# Patient Record
Sex: FEMALE | Race: OTHER | HISPANIC OR LATINO | URBAN - METROPOLITAN AREA
[De-identification: names, ages, dates, MRNs, and addresses within clinical notes are randomized per-mention and may not be internally consistent; named-entity substitution may affect disease eponyms.]

---

## 2022-07-11 ENCOUNTER — OUTPATIENT (OUTPATIENT)
Dept: OUTPATIENT SERVICES | Facility: HOSPITAL | Age: 27
LOS: 1 days | End: 2022-07-11
Payer: MEDICAID

## 2022-07-11 VITALS
DIASTOLIC BLOOD PRESSURE: 64 MMHG | HEART RATE: 67 BPM | WEIGHT: 127.43 LBS | SYSTOLIC BLOOD PRESSURE: 110 MMHG | OXYGEN SATURATION: 100 % | RESPIRATION RATE: 16 BRPM | TEMPERATURE: 99 F

## 2022-07-11 DIAGNOSIS — Z01.818 ENCOUNTER FOR OTHER PREPROCEDURAL EXAMINATION: ICD-10-CM

## 2022-07-11 DIAGNOSIS — Z33.2 ENCOUNTER FOR ELECTIVE TERMINATION OF PREGNANCY: ICD-10-CM

## 2022-07-11 DIAGNOSIS — Z90.49 ACQUIRED ABSENCE OF OTHER SPECIFIED PARTS OF DIGESTIVE TRACT: Chronic | ICD-10-CM

## 2022-07-11 LAB
BLD GP AB SCN SERPL QL: SIGNIFICANT CHANGE UP
HCG SERPL-ACNC: HIGH MIU/ML
HCT VFR BLD CALC: 39 % — SIGNIFICANT CHANGE UP (ref 34.5–45)
HGB BLD-MCNC: 12.4 G/DL — SIGNIFICANT CHANGE UP (ref 11.5–15.5)
MCHC RBC-ENTMCNC: 26.1 PG — LOW (ref 27–34)
MCHC RBC-ENTMCNC: 31.8 GM/DL — LOW (ref 32–36)
MCV RBC AUTO: 81.9 FL — SIGNIFICANT CHANGE UP (ref 80–100)
NRBC # BLD: 0 /100 WBCS — SIGNIFICANT CHANGE UP (ref 0–0)
PLATELET # BLD AUTO: 303 K/UL — SIGNIFICANT CHANGE UP (ref 150–400)
RBC # BLD: 4.76 M/UL — SIGNIFICANT CHANGE UP (ref 3.8–5.2)
RBC # FLD: 14.1 % — SIGNIFICANT CHANGE UP (ref 10.3–14.5)
SARS-COV-2 RNA SPEC QL NAA+PROBE: SIGNIFICANT CHANGE UP
WBC # BLD: 8.4 K/UL — SIGNIFICANT CHANGE UP (ref 3.8–10.5)
WBC # FLD AUTO: 8.4 K/UL — SIGNIFICANT CHANGE UP (ref 3.8–10.5)

## 2022-07-11 PROCEDURE — U0005: CPT

## 2022-07-11 PROCEDURE — G0463: CPT

## 2022-07-11 PROCEDURE — U0003: CPT

## 2022-07-11 PROCEDURE — 86850 RBC ANTIBODY SCREEN: CPT

## 2022-07-11 PROCEDURE — 36415 COLL VENOUS BLD VENIPUNCTURE: CPT

## 2022-07-11 PROCEDURE — 85027 COMPLETE CBC AUTOMATED: CPT

## 2022-07-11 PROCEDURE — 86900 BLOOD TYPING SEROLOGIC ABO: CPT

## 2022-07-11 PROCEDURE — 86901 BLOOD TYPING SEROLOGIC RH(D): CPT

## 2022-07-11 PROCEDURE — 84702 CHORIONIC GONADOTROPIN TEST: CPT

## 2022-07-11 NOTE — H&P PST ADULT - NSANTHOSAYNRD_GEN_A_CORE
No. CIERA screening performed.  STOP BANG Legend: 0-2 = LOW Risk; 3-4 = INTERMEDIATE Risk; 5-8 = HIGH Risk

## 2022-07-11 NOTE — H&P PST ADULT - PROBLEM SELECTOR PLAN 2
No medical clearance needed as per surgeon. CBC, T&S, HCG  and COVID19 PCR ordered. Pre-op instructions and surgical scrubs given and pt verbalized understanding. No medical clearance needed as per surgeon. CBC, T&S, HCG  and COVID19 PCR ordered. Pre-op instructions given and pt verbalized understanding.

## 2022-07-11 NOTE — H&P PST ADULT - HISTORY OF PRESENT ILLNESS
27 y/o female with no PMH here for PST. LMP - 22. Pt desiring termination. Pt denies n/v, vaginal cramping and abdominal pain. ,  x 2. Pt electing for dilation and curettage on 2022.  27 y/o female with no PMH here for PST. Pt is 6 weeks gestation. LMP - 22. Pt desiring termination. Pt denies n/v, vaginal cramping and abdominal pain. ,  x 2. Pt electing for dilation and curettage on 2022.

## 2022-07-11 NOTE — H&P PST ADULT - GASTROINTESTINAL
negative normal/soft/nontender/nondistended/normal active bowel sounds normal/soft/nondistended/normal active bowel sounds/tender

## 2022-07-11 NOTE — H&P PST ADULT - NEUROLOGICAL
normal/cranial nerves II-XII intact/sensation intact details… normal/sensation intact/responds to verbal commands

## 2022-07-11 NOTE — H&P PST ADULT - ASSESSMENT
27 y/o female with encounter for elective termination of pregnancy  25 y/o female with encounter for elective termination of pregnancy.

## 2022-07-11 NOTE — H&P PST ADULT - ACTIVITY
minor surgery planned
Clothing
Pt can walk up 2 flights of stairs and walk 4 blocks without difficulty

## 2022-07-11 NOTE — H&P PST ADULT - FALL HARM RISK - UNIVERSAL INTERVENTIONS
Bed in lowest position, wheels locked, appropriate side rails in place/Call bell, personal items and telephone in reach/Instruct patient to call for assistance before getting out of bed or chair/Non-slip footwear when patient is out of bed/[place] to call system/Physically safe environment - no spills, clutter or unnecessary equipment/Purposeful Proactive Rounding/Room/bathroom lighting operational, light cord in reach

## 2022-07-13 RX ORDER — SODIUM CHLORIDE 9 MG/ML
1000 INJECTION, SOLUTION INTRAVENOUS
Refills: 0 | Status: DISCONTINUED | OUTPATIENT
Start: 2022-07-14 | End: 2022-07-28

## 2022-07-14 ENCOUNTER — OUTPATIENT (OUTPATIENT)
Dept: OUTPATIENT SERVICES | Facility: HOSPITAL | Age: 27
LOS: 1 days | End: 2022-07-14
Payer: MEDICAID

## 2022-07-14 VITALS
DIASTOLIC BLOOD PRESSURE: 55 MMHG | RESPIRATION RATE: 14 BRPM | SYSTOLIC BLOOD PRESSURE: 98 MMHG | TEMPERATURE: 98 F | WEIGHT: 127.43 LBS | OXYGEN SATURATION: 99 % | HEART RATE: 77 BPM

## 2022-07-14 VITALS
RESPIRATION RATE: 15 BRPM | DIASTOLIC BLOOD PRESSURE: 68 MMHG | TEMPERATURE: 98 F | OXYGEN SATURATION: 100 % | SYSTOLIC BLOOD PRESSURE: 106 MMHG | HEART RATE: 70 BPM

## 2022-07-14 DIAGNOSIS — Z90.49 ACQUIRED ABSENCE OF OTHER SPECIFIED PARTS OF DIGESTIVE TRACT: Chronic | ICD-10-CM

## 2022-07-14 DIAGNOSIS — Z33.2 ENCOUNTER FOR ELECTIVE TERMINATION OF PREGNANCY: ICD-10-CM

## 2022-07-14 PROCEDURE — 93005 ELECTROCARDIOGRAM TRACING: CPT

## 2022-07-14 PROCEDURE — 93010 ELECTROCARDIOGRAM REPORT: CPT

## 2022-07-14 PROCEDURE — 88305 TISSUE EXAM BY PATHOLOGIST: CPT

## 2022-07-14 PROCEDURE — 88305 TISSUE EXAM BY PATHOLOGIST: CPT | Mod: 26

## 2022-07-14 PROCEDURE — 59840 INDUCED ABORTION D&C: CPT

## 2022-07-14 RX ORDER — SODIUM CHLORIDE 9 MG/ML
1000 INJECTION, SOLUTION INTRAVENOUS
Refills: 0 | Status: DISCONTINUED | OUTPATIENT
Start: 2022-07-14 | End: 2022-07-14

## 2022-07-14 RX ORDER — OXYCODONE HYDROCHLORIDE 5 MG/1
5 TABLET ORAL ONCE
Refills: 0 | Status: DISCONTINUED | OUTPATIENT
Start: 2022-07-14 | End: 2022-07-14

## 2022-07-14 RX ORDER — HYDROMORPHONE HYDROCHLORIDE 2 MG/ML
0.5 INJECTION INTRAMUSCULAR; INTRAVENOUS; SUBCUTANEOUS
Refills: 0 | Status: DISCONTINUED | OUTPATIENT
Start: 2022-07-14 | End: 2022-07-14

## 2022-07-14 RX ORDER — ONDANSETRON 8 MG/1
4 TABLET, FILM COATED ORAL ONCE
Refills: 0 | Status: DISCONTINUED | OUTPATIENT
Start: 2022-07-14 | End: 2022-07-14

## 2022-07-14 RX ADMIN — SODIUM CHLORIDE 75 MILLILITER(S): 9 INJECTION, SOLUTION INTRAVENOUS at 10:28

## 2022-07-14 NOTE — ASU DISCHARGE PLAN (ADULT/PEDIATRIC) - CARE PROVIDER_API CALL
Rigo Stuart)  Obstetrics and Gynecology  [address] [place] [address]  Phone: [phone]  Fax: [phone]  Follow Up Time:

## 2022-07-14 NOTE — ASU DISCHARGE PLAN (ADULT/PEDIATRIC) - NS MD DC FALL RISK RISK
For information on Fall & Injury Prevention, visit: [email or web address] OR  [email or web address] OR  https://www.cdc.gov/steadi/patient.html

## 2022-07-15 LAB — SURGICAL PATHOLOGY STUDY: SIGNIFICANT CHANGE UP

## 2023-04-03 ENCOUNTER — OUTPATIENT (OUTPATIENT)
Dept: OUTPATIENT SERVICES | Facility: HOSPITAL | Age: 28
LOS: 1 days | End: 2023-04-03
Payer: MEDICAID

## 2023-04-03 VITALS
WEIGHT: 151.9 LBS | DIASTOLIC BLOOD PRESSURE: 71 MMHG | HEIGHT: 62 IN | OXYGEN SATURATION: 98 % | SYSTOLIC BLOOD PRESSURE: 112 MMHG | TEMPERATURE: 98 F

## 2023-04-03 DIAGNOSIS — Z01.818 ENCOUNTER FOR OTHER PREPROCEDURAL EXAMINATION: ICD-10-CM

## 2023-04-03 DIAGNOSIS — Z90.49 ACQUIRED ABSENCE OF OTHER SPECIFIED PARTS OF DIGESTIVE TRACT: Chronic | ICD-10-CM

## 2023-04-03 DIAGNOSIS — Z98.890 OTHER SPECIFIED POSTPROCEDURAL STATES: Chronic | ICD-10-CM

## 2023-04-03 DIAGNOSIS — R87.612 LOW GRADE SQUAMOUS INTRAEPITHELIAL LESION ON CYTOLOGIC SMEAR OF CERVIX (LGSIL): ICD-10-CM

## 2023-04-03 LAB
HCG SERPL-ACNC: <1 MIU/ML — SIGNIFICANT CHANGE UP
HCT VFR BLD CALC: 37.7 % — SIGNIFICANT CHANGE UP (ref 34.5–45)
HGB BLD-MCNC: 11.2 G/DL — LOW (ref 11.5–15.5)
MCHC RBC-ENTMCNC: 23.4 PG — LOW (ref 27–34)
MCHC RBC-ENTMCNC: 29.7 GM/DL — LOW (ref 32–36)
MCV RBC AUTO: 78.9 FL — LOW (ref 80–100)
NRBC # BLD: 0 /100 WBCS — SIGNIFICANT CHANGE UP (ref 0–0)
PLATELET # BLD AUTO: 293 K/UL — SIGNIFICANT CHANGE UP (ref 150–400)
RBC # BLD: 4.78 M/UL — SIGNIFICANT CHANGE UP (ref 3.8–5.2)
RBC # FLD: 15.9 % — HIGH (ref 10.3–14.5)
WBC # BLD: 8.73 K/UL — SIGNIFICANT CHANGE UP (ref 3.8–10.5)
WBC # FLD AUTO: 8.73 K/UL — SIGNIFICANT CHANGE UP (ref 3.8–10.5)

## 2023-04-03 PROCEDURE — 88321 CONSLTJ&REPRT SLD PREP ELSWR: CPT

## 2023-04-03 PROCEDURE — 84702 CHORIONIC GONADOTROPIN TEST: CPT

## 2023-04-03 PROCEDURE — 85027 COMPLETE CBC AUTOMATED: CPT

## 2023-04-03 PROCEDURE — G0463: CPT

## 2023-04-03 PROCEDURE — 36415 COLL VENOUS BLD VENIPUNCTURE: CPT

## 2023-04-03 NOTE — H&P PST ADULT - PROBLEM SELECTOR PLAN 2
Labs - CBC and HCG  No MC needed  Pre op instructions reviewed and given. No meds to take am of surgery. Instructed to hold and/or avoid other NSAIDs and OTC supplements. Tylenol is ok. Verbalized understanding

## 2023-04-03 NOTE — H&P PST ADULT - NSICDXPASTMEDICALHX_GEN_ALL_CORE_FT
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

## 2023-04-03 NOTE — H&P PST ADULT - NSANTHOSAYNRD_GEN_A_CORE
Denies CIERA/No. CIERA screening performed.  STOP BANG Legend: 0-2 = LOW Risk; 3-4 = INTERMEDIATE Risk; 5-8 = HIGH Risk

## 2023-04-03 NOTE — H&P PST ADULT - RESPIRATORY
no wheezes/no respiratory distress/no use of accessory muscles/good air movement/respirations non-labored

## 2023-04-03 NOTE — H&P PST ADULT - NSICDXPASTSURGICALHX_GEN_ALL_CORE_FT
PAST SURGICAL HISTORY:  One previous induced termination of pregnancy     S/P cholecystectomy (4/8/2022)

## 2023-04-03 NOTE — H&P PST ADULT - HISTORY OF PRESENT ILLNESS
26 yo [place]ander speaking healthy female with no PMH, presents to PST scheduled for a cold knife cone biopsy cervix on 4/6 with Dr. Stuart. Reports that her last Pap smear was abnormal. Denies abnormal bleeding and vaginal/pelvic cramping and pain

## 2023-04-03 NOTE — H&P PST ADULT - SOURCE OF INFORMATION, PROFILE
Marquise VINITA Shaikh discharged to home accompanied by no one  Patient provided with the following educational materials upon discharge:  cellulitis.   Valuables and belongings sent with patient.   discharge summary, discharge instructions, medications and follow up appointments reviewed with patient  Patient  verbalized understanding   patient/chart(s)

## 2023-04-05 LAB — SURGICAL PATHOLOGY STUDY: SIGNIFICANT CHANGE UP

## 2023-04-05 NOTE — ASU PATIENT PROFILE, ADULT - FALL HARM RISK - UNIVERSAL INTERVENTIONS
Bed in lowest position, wheels locked, appropriate side rails in place/Call bell, personal items and telephone in reach/Instruct patient to call for assistance before getting out of bed or chair/Non-slip footwear when patient is out of bed/[place] to call system/Physically safe environment - no spills, clutter or unnecessary equipment/Purposeful Proactive Rounding/Room/bathroom lighting operational, light cord in reach

## 2023-04-05 NOTE — ASU PATIENT PROFILE, ADULT - CENTRAL VENOUS CATHETER
7/11/2017    Yi Nix  [address] [facility] [address]    Dear Yi:    Critical access hospital wants to ensure your discharge home is safe and you or your loved ones have had all of your questions answered regarding your care after you leave the hospital.    Below is a list of resources and contact information should you have any questions regarding your hospital stay, follow-up instructions, or active medical symptoms.    Questions or Concerns Regarding… Contact   Medical Questions Related to Your Discharge  (7 days a week, 8am-5pm) Contact a Nurse Care Coordinator   [phone]   Medical Questions Not Related to Your Discharge  (24 hours a day / 7 days a week)  Contact the Nurse Health Line   [phone]    Medications or Discharge Instructions Refer to your discharge packet   or contact your [facility] Primary Care Provider   [phone]   Follow-up Appointment(s) Schedule your appointment via [employer]   or contact Scheduling [phone]   Billing Review your statement via [employer]  or contact Billing [phone]   Medical Records Review your records via [employer]   or contact Medical Records [phone]     You may receive a telephone call within two days of discharge. This call is to make certain you understand your discharge instructions and have the opportunity to have any questions answered. You can also easily access your medical information, test results and upcoming appointments via the [employer] free online health management tool. You can learn more and sign up at [email or web address]. For assistance setting up your [employer] account, please call [phone].    Once again, we want to ensure your discharge home is safe and that you have a clear understanding of any next steps in your care. If you have any questions or concerns, please do not hesitate to contact us, we are here for you. Thank you for choosing [facility] for your healthcare needs.    Sincerely,    Your [facility] Healthcare Team          no

## 2023-04-06 ENCOUNTER — OUTPATIENT (OUTPATIENT)
Dept: OUTPATIENT SERVICES | Facility: HOSPITAL | Age: 28
LOS: 1 days | End: 2023-04-06
Payer: MEDICAID

## 2023-04-06 VITALS
RESPIRATION RATE: 16 BRPM | WEIGHT: 149.91 LBS | HEIGHT: 64 IN | DIASTOLIC BLOOD PRESSURE: 72 MMHG | OXYGEN SATURATION: 100 % | TEMPERATURE: 98 F | HEART RATE: 88 BPM | SYSTOLIC BLOOD PRESSURE: 114 MMHG

## 2023-04-06 VITALS
RESPIRATION RATE: 12 BRPM | HEART RATE: 55 BPM | DIASTOLIC BLOOD PRESSURE: 54 MMHG | SYSTOLIC BLOOD PRESSURE: 89 MMHG | OXYGEN SATURATION: 98 %

## 2023-04-06 DIAGNOSIS — Z98.890 OTHER SPECIFIED POSTPROCEDURAL STATES: Chronic | ICD-10-CM

## 2023-04-06 DIAGNOSIS — Z01.818 ENCOUNTER FOR OTHER PREPROCEDURAL EXAMINATION: ICD-10-CM

## 2023-04-06 DIAGNOSIS — Z90.49 ACQUIRED ABSENCE OF OTHER SPECIFIED PARTS OF DIGESTIVE TRACT: Chronic | ICD-10-CM

## 2023-04-06 DIAGNOSIS — R87.612 LOW GRADE SQUAMOUS INTRAEPITHELIAL LESION ON CYTOLOGIC SMEAR OF CERVIX (LGSIL): ICD-10-CM

## 2023-04-06 PROCEDURE — 88305 TISSUE EXAM BY PATHOLOGIST: CPT

## 2023-04-06 PROCEDURE — 88307 TISSUE EXAM BY PATHOLOGIST: CPT | Mod: 26

## 2023-04-06 PROCEDURE — 88305 TISSUE EXAM BY PATHOLOGIST: CPT | Mod: 26

## 2023-04-06 PROCEDURE — 57520 CONIZATION OF CERVIX: CPT

## 2023-04-06 PROCEDURE — 88307 TISSUE EXAM BY PATHOLOGIST: CPT

## 2023-04-06 RX ORDER — HYDROMORPHONE HYDROCHLORIDE 2 MG/ML
0.5 INJECTION INTRAMUSCULAR; INTRAVENOUS; SUBCUTANEOUS
Refills: 0 | Status: DISCONTINUED | OUTPATIENT
Start: 2023-04-06 | End: 2023-04-06

## 2023-04-06 RX ORDER — SODIUM CHLORIDE 9 MG/ML
1000 INJECTION, SOLUTION INTRAVENOUS
Refills: 0 | Status: DISCONTINUED | OUTPATIENT
Start: 2023-04-06 | End: 2023-04-06

## 2023-04-06 RX ORDER — ACETAMINOPHEN 500 MG
1000 TABLET ORAL ONCE
Refills: 0 | Status: COMPLETED | OUTPATIENT
Start: 2023-04-06 | End: 2023-04-06

## 2023-04-06 RX ORDER — ONDANSETRON 8 MG/1
4 TABLET, FILM COATED ORAL ONCE
Refills: 0 | Status: DISCONTINUED | OUTPATIENT
Start: 2023-04-06 | End: 2023-04-06

## 2023-04-06 RX ADMIN — Medication 400 MILLIGRAM(S): at 09:05

## 2023-04-06 RX ADMIN — SODIUM CHLORIDE 75 MILLILITER(S): 9 INJECTION, SOLUTION INTRAVENOUS at 09:04

## 2023-04-06 NOTE — ASU DISCHARGE PLAN (ADULT/PEDIATRIC) - CARE PROVIDER_API CALL
Rigo Stuart)  Obstetrics and Gynecology  [address] [place] [address]  Phone: [phone]  Fax: [phone]  Follow Up Time:

## 2023-04-07 LAB — SURGICAL PATHOLOGY STUDY: SIGNIFICANT CHANGE UP

## 2023-09-21 NOTE — ASU PATIENT PROFILE, ADULT - HOW PATIENT ADDRESSED, PROFILE
Winlevi Counseling:  I discussed with the patient the risks of topical clascoterone including but not limited to erythema, scaling, itching, and stinging. Patient voiced their understanding. lorie

## 2023-12-22 NOTE — H&P PST ADULT - NEUROLOGICAL
Sinusitis (Antibiotic Treatment)  The sinuses are air-filled spaces within the bones of the face. They connect to the inside of the nose. Sinusitis is an inflammation of the tissue lining the sinus cavity. Sinus inflammation can occur during a cold. It can also be due to allergies to pollens and other particles in the air. Sinusitis can cause symptoms of sinus congestion and fullness. A sinus infection causes fever, headache and facial pain. There is often green or yellow drainage from the nose or into the back of the throat (post-nasal drip). You have been given antibiotics to treat this condition.  Home care:  Take the full course of antibiotics as instructed. Do not stop taking them, even if you feel better.  Drink plenty of water, hot tea, and other liquids. This may help thin mucus. It also may promote sinus drainage.  Heat may help soothe painful areas of the face. Use a towel soaked in hot water. Or,  the shower and direct the hot spray onto your face. Using a vaporizer along with a menthol rub at night may also help.   An expectorant containing guaifenesin may help thin the mucus and promote drainage from the sinuses.  Use acetaminophen or ibuprofen to control pain, unless another pain medicine was prescribed. (If you have chronic liver or kidney disease or ever had a stomach ulcer, talk with your doctor before using these medicines. Aspirin should never be used in anyone under 18 years of age who is ill with a fever. It may cause severe liver damage.)  Don't smoke. This can worsen symptoms.  Stop using antihistamines like Claritin/Zyrtec for now -- these can prolong your sinus symptoms by making the mucus more \"sticky/glue-like\".  See more advice on OTC/home remedies below.  Follow-up care  Follow up with your healthcare provider if you are not improving within the next week.  When to seek medical advice  Call your healthcare provider if any of these occur:  Facial pain or headache becoming more  severe  Stiff neck  Unusual drowsiness or confusion  Swelling of the forehead or eyelids  Vision problems, including blurred or double vision  Fever of 100.4ºF (38ºC) or higher, or as directed by your healthcare provider  Seizure  Breathing problems  Symptoms not resolving within 10 days        ----------------------------    Home therapies for Sinus Symptoms    Ibuprofen (600 mg with each meal 3 times/day) or Aleve (2 tablets with food every 12 hours) for at least 3 days -- PLUS  Mucinex DM per package directions.     Sinus saline rinse (Neilmed Sinus Rinse Squirt Bottle, available at all pharmacies)  -- do not use if having ear pain/pressure.   -At least twice a day. (See below for further information.    Nasal spray to decrease nasal congestion   -You can get nasal steroid sprays over-the-counter, such as Flonase or Nasacort. Use one as instructed on the label for a couple of weeks.    Nasal Sprays:  How to Use Them Correctly  (\"Point your nose at your toes and sniff a basia\")    Gently blow your nose to clear it of mucus before using the medication.  Remove the cap.  Shake the bottle.  (The first time you use the pump spray each day, you may have to “prime” it by squirting a few times into the air until a fine mist comes out.)  Tilt your head forward slightly and look down to \"point your nose at your toes\".  Breathe out slowly.  Close one nostril by gently pressing against the side of your nose with  your finger.  Insert the tip of the nasal spray into the other nostril, aim the nozzle toward the outside of the nose away from the nasal septum. (SEE DIAGRAM BELOW).  Aim towards the eye on the same side of the nose.  It is sometimes helpful to use the opposite hand to apply the spray (right hand for left nostril and left hand for right nostril).  Squeeze the pump as you begin to breathe in slowly through your nose, like \"sniffing a basia\".    Remove the spray from the nostril and breathe out through your mouth. Try  not to sneeze or blow your nose just after using the spray.  Just dab your nose with a tissue if the medicine drips out.  Repeat these steps for the other nostril.  If you are using more then one spray in each nostril, follow all these steps again.  Replace the lid on the bottle.        Helpful Hints    Wash the canister device at least once a week.  Be sure you can sniff air though each nostril before spraying, otherwise the medicine will be wasted, since it will not go deep into the inside of your nose.   If the pump spray is used correctly, the spray should not drip from your nose or down the back of your throat.   If your nose hurts, if you begin to have nosebleeds or if the inside of your nose stings, stop using the spray for 1 to 2 days.  Sometimes it helps to use a saline nose spray such as NasoGel, [place], or [place] Nasal Mist just before you use your regular medicine.   Use your medicine just the way your doctor tells you.  Most nasal sprays work best when used regularly and consistently. Remember, it may take up to 2 weeks of using a nasal steroid spray before you notice the full effects.     -Do NOT use over-the-counter Afrin (oxymetazoline)  or Nyquil 4-Way (phenylephrine), which can make your nasal congestion worse after using it more than 3 days in a row.    Nasal spray to lubricate the nose: Naso-Gel Saline Gel Spray over-the-counter, use as instructed on the label.  Breathe-Right Nasal Strips to open plugged nostrils.  Humidifier/vaporizer in sleeping quarters.    ----------------------------    Instructions for middle-ear pressure and congestion:      Hot moist compresses under affected ears for 5 minutes every 4 hours, followed by gentle downward rubbing with your fingertips over the side of the neck.    After 48-72 hours, start \"intentional ear popping\" by blowing out through your nose while pinching your nostrils shut.  Do this repeatedly (such as 10 times in a row, 3 times per day).  This should  help your ear drainage-tubes to gradually release the pressure/fluid from behind your eardrums.    ----------------------------  Sinus Saline Rinsing    I recommend the Neilmed Sinus Rinse Squirt Bottle, available at all pharmacies. (It's easier to use than the \"Neti-Pot\").  Do not use a sinus rinse if you are currently having ear pain/pressure.  Rinse twice a day while having sinus pressure or pain.  Also do it daily during your allergy season to prevent congestion in the nose and sinuses!  If rinsing in the evening, do it at least 30 minutes before lying down (to minimize post-nasal drip).    If you are anxious about trying sinus rinsing, here's a few tips to overcome your fear:  The first time you use it, try it while you are taking a shower -- the sensation of water all over your body helps distract you from the sensation of water in your nose.  You can try a \"beginner's method\" of rinsing -- instead of inserting the nozzle into your nostril, leave the nozzle slightly outside of your nostril and let the water drain back out through the same nostril (instead of trying to make the water flush all the way through the other side of your nose).  During the rinse, if you have draining into your throat -- focus on breathing slowly through the mouth and also speak the sound of the letter \"K\" repeatedly while rinsing.  ----------------------------  --------------------------------------------------------------------------------------------------------------    [facility]    Monday - Friday 8:00 a.m. - 8:00 p.m.  Saturday - Sunday 8:00 a.m. - 4:00 p.m.    -*-*-*-*-*-*-*-  [facility] is now OPEN Monday to Saturday  [address] [place] [address]  -*-*-*-*-*-*-*-    [email or web address]  See current wait times for [facility] in real-time  Reserve your waiting-room spot in line     [email or web address]  [place] for the patient portal  See test results and make virtual  visits with your primary care provider      -*-*-*-*-*-*-*-  ----------------  Thank you for choosing [facility] today.  We hope you had a pleasant experience and we look forward to serving your future needs.  If you receive a survey in the mail about today's services, we hope that you will take a few minutes to let us know about your experience.    If you have any questions about your VISIT, please call [phone]    If you have any questions about your BILL, please call [phone]    If you need a copy of your MEDICAL RECORD, please call [phone] or email [email or web address] or use the [employer] claire->My Record->Document Center->Download Medical Records->Send Medical Record Request.    --------------------------------------------------------------------------------------------------------------  UNLESS OTHERWISE INSTRUCTED BY YOUR URGENT CARE PROVIDER TODAY, all follow-up for your medical issues should be managed by your primary care provider.  The Urgent Care does not manage chronic medical issues or refill medications.  You are responsible for scheduling and keeping any necessary follow-up visits with your primary care provider after this visit today.   --------------------------------------------------------------------------------------------------------------  IF YOU WERE PRESCRIBED AN ANTIBIOTIC TODAY:  We recommend taking an over-the-counter probiotic (Such as Florajen 3 for adults, or Faacaamp8Usbn for children -- AVAILABLE IN THE [facility] and other local pharmacies too) once a day for the entire duration of your antibiotics, and continuing it for 2 weeks after the antibiotics are finished.  This will help reduce your chance of developing antibiotic-related diarrhea and/or yeast infections.  --------------------------------------------------------------------------------------------------------------  IF YOU HAVE LAB RESULTS or X-RAY REPORTS  STILL PENDING:  We typically call patients back only if (1) the results are \"significantly abnormal\", (2) we need to change your treatment plan based on the results, or (3) the report is different than what we told you during your visit.  If we have not called you about your results 48 hours after your visit, you can call us at [phone] to check on the status.  --------------------------------------------------------------------------------------------------------------      negative responds to pain/responds to verbal commands

## (undated) DEVICE — PACK LITHOTOMY

## (undated) DEVICE — VACUUM CURETTE BUSSE HOSP STRAIGHT 7MM

## (undated) DEVICE — DRSG TELFA 3 X 8

## (undated) DEVICE — SOL LUGOLS IOD 8ML

## (undated) DEVICE — DRAPE LIGHT HANDLE COVER (GREEN)

## (undated) DEVICE — VACUUM CURETTE BERKLEY OLYMPUS F TIP 6MM

## (undated) DEVICE — DRAPE MAYO STAND 23"

## (undated) DEVICE — DRAPE CAMERA ENDOMATE

## (undated) DEVICE — PLV-SCD MACHINE: Type: DURABLE MEDICAL EQUIPMENT

## (undated) DEVICE — VENODYNE/SCD SLEEVE CALF LARGE

## (undated) DEVICE — VACUUM CURETTE BUSSE HOSP CURVED 9MM

## (undated) DEVICE — SYR LUER LOK 10CC

## (undated) DEVICE — PROCTOSCOPIC SWABSTICK 16"

## (undated) DEVICE — BUFFALO FILTER VACCUM HOSE DISP

## (undated) DEVICE — DRSG PAD EYE OVAL

## (undated) DEVICE — APPLICATOR Q TIP 6" WOOD STEM

## (undated) DEVICE — VENODYNE/SCD SLEEVE CALF MEDIUM

## (undated) DEVICE — DRAPE TOWEL BLUE 17" X 24"

## (undated) DEVICE — PREP TRAY DRY SKIN PREP SCRUB

## (undated) DEVICE — TUBING GYRUS ACMI COLLECTION SET 6FT

## (undated) DEVICE — GOWN SMARTGOWN RAGLAN XLG

## (undated) DEVICE — SUT CHROMIC 2-0 30" GS-22

## (undated) DEVICE — PLV/PSP-ESU FORCEFX F8I7418A: Type: DURABLE MEDICAL EQUIPMENT

## (undated) DEVICE — NDL COUNTER FOAM AND MAGNET 20-40

## (undated) DEVICE — VACUUM CURETTE BUSSE HOSP CURVED 12MM

## (undated) DEVICE — GLV 7 PROTEXIS (WHITE)

## (undated) DEVICE — SUT SOFSILK 2-0 18" C-15

## (undated) DEVICE — TUBING SMOKE EVACUATOR 6FT

## (undated) DEVICE — VACUUM CURETTE BUSSE HOSP CURVED 8MM

## (undated) DEVICE — WARMING BLANKET UPPER ADULT

## (undated) DEVICE — PREP KIT SKIN PREP DRY

## (undated) DEVICE — SYR ASEPTO

## (undated) DEVICE — Device

## (undated) DEVICE — NDL HYPO SAFE 25G X 1.5" (ORANGE)

## (undated) DEVICE — SOL IRR POUR NS 0.9% 1000ML

## (undated) DEVICE — ELCTR BOVIE PENCIL SMOKE EVACUATION

## (undated) DEVICE — VACUUM CURETTE BUSSE HOSP CURVED 11MM